# Patient Record
Sex: FEMALE | Race: WHITE | Employment: PART TIME | URBAN - METROPOLITAN AREA
[De-identification: names, ages, dates, MRNs, and addresses within clinical notes are randomized per-mention and may not be internally consistent; named-entity substitution may affect disease eponyms.]

---
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
## 2021-01-04 ENCOUNTER — HEALTH MAINTENANCE LETTER (OUTPATIENT)
Age: 21
End: 2021-01-04

## 2021-04-25 ENCOUNTER — HEALTH MAINTENANCE LETTER (OUTPATIENT)
Age: 21
End: 2021-04-25

## 2021-06-11 ENCOUNTER — RECORDS - HEALTHEAST (OUTPATIENT)
Dept: LAB | Facility: CLINIC | Age: 21
End: 2021-06-11

## 2021-06-11 LAB — TSH SERPL DL<=0.005 MIU/L-ACNC: 1.49 UIU/ML (ref 0.3–5)

## 2021-06-13 LAB — DHEA-S SERPL-MCNC: 238 UG/DL (ref 63–373)

## 2021-06-14 LAB — 25(OH)D3 SERPL-MCNC: 31.3 NG/ML (ref 30–80)

## 2021-10-10 ENCOUNTER — HEALTH MAINTENANCE LETTER (OUTPATIENT)
Age: 21
End: 2021-10-10

## 2022-05-18 NOTE — GROUP NOTE
Psychotherapy Group Note    PATIENT'S NAME: Mirtha Gary  MRN:   [id number]  :   2000  ACCT. NUMBER: [id number]  DATE OF SERVICE: 20  START TIME: 10:00 AM  END TIME: 10:50 AM  FACILITATOR: Lety Cheek [facility]  TOPIC:  EBP Group: DDP Relapse Prevention  Adult Dual Diagnosis Day Treatment  TRACK: 1    NUMBER OF PARTICIPANTS: 4    Telemedicine Visit: The patient's condition can be safely assessed and treated via synchronous audio and visual telemedicine encounter.      Reason for Telemedicine Visit: Services only offered telehealth    Originating Site (Patient Location): Patient's home    Distant Site (Provider Location): Provider Remote Setting    Consent:  The patient/guardian has verbally consented to: the potential risks and benefits of telemedicine (video visit) versus in person care; bill my insurance or make self-payment for services provided; and responsibility for payment of non-covered services.     Mode of Communication:  Video Conference via [employer]    As the provider I attest to compliance with applicable laws and regulations related to telemedicine.      Summary of Group / Topics Discussed:  DDP Relapse Prevention: Observing and Describing Triggers for Substance Use: Patients explored in greater depth factors that contribute to a relapse including: emotions, thoughts, and situations that trigger substance use. Goal of topic is to assist patients in increased recognition of specific emotions, thoughts, and situations they may experience that increases risk. Patients were able to identify and share their specific emotions, thoughts, and situations with the group. Patients also learned  bridge burning  skill to assist in removing means of acting on substance use.    Patient Session Goals / Objectives:    Verbalized understanding of the importance of awareness of triggers for substance use    Identified their own individual triggers    [place] effective coping skills in response to triggers  for substance use including  bridge burning  skill      Patient Participation / Response:  Fully participated with the group by sharing personal reflections / insights and openly received / provided feedback with other participants.    Demonstrated understanding of topics discussed through group discussion and participation, Demonstrated understanding of utilizing relapse prevention skills to manage urges and maintain sobriety, Identified / Expressed personal readiness to utilize relapse prevention skills and Verbalized understanding of how relapse prevention skills can assist in maintaining sobriety    Treatment Plan:  Patient has a current master individualized treatment plan.  See Epic treatment plan for more information.    Lety Cheek, [facility]    Clindamycin Pregnancy And Lactation Text: This medication can be used in pregnancy if certain situations. Clindamycin is also present in breast milk.

## 2022-05-22 ENCOUNTER — HEALTH MAINTENANCE LETTER (OUTPATIENT)
Age: 22
End: 2022-05-22

## 2022-06-06 NOTE — TELEPHONE ENCOUNTER
Shannon Pruitt is requesting a refill on the following medication(s):  Requested Prescriptions     Pending Prescriptions Disp Refills    fluticasone-salmeterol (ADVAIR DISKUS) 250-50 MCG/ACT AEPB diskus inhaler [Pharmacy Med Name: Kevin Sweeney 250-50 DISKUS]       Sig: inhale 1 puff by mouth and INTO THE LUNGS every 12 hours       Last Visit Date (If Applicable):  9/8/1808    Next Visit Date:    8/4/2022 Writer spoke with Mirtha to complete discharge assessments (PHQ-9 and ALETHA-7) over the phone.      Lety Cheek LPC on 4/29/2020 at 11:56 AM

## 2022-06-30 ENCOUNTER — LAB REQUISITION (OUTPATIENT)
Dept: LAB | Facility: CLINIC | Age: 22
End: 2022-06-30

## 2022-06-30 DIAGNOSIS — R53.82 CHRONIC FATIGUE, UNSPECIFIED: ICD-10-CM

## 2022-06-30 PROCEDURE — 84443 ASSAY THYROID STIM HORMONE: CPT | Performed by: NURSE PRACTITIONER

## 2022-06-30 PROCEDURE — 82306 VITAMIN D 25 HYDROXY: CPT | Performed by: NURSE PRACTITIONER

## 2022-07-01 LAB — TSH SERPL DL<=0.005 MIU/L-ACNC: 2.57 UIU/ML (ref 0.3–4.2)

## 2022-07-04 LAB — DEPRECATED CALCIDIOL+CALCIFEROL SERPL-MC: 51 UG/L (ref 20–75)

## 2022-09-18 ENCOUNTER — HEALTH MAINTENANCE LETTER (OUTPATIENT)
Age: 22
End: 2022-09-18

## 2023-06-04 ENCOUNTER — HEALTH MAINTENANCE LETTER (OUTPATIENT)
Age: 23
End: 2023-06-04

## 2023-06-07 ENCOUNTER — LAB REQUISITION (OUTPATIENT)
Dept: LAB | Facility: CLINIC | Age: 23
End: 2023-06-07
Payer: COMMERCIAL

## 2023-06-07 DIAGNOSIS — M13.0 POLYARTHRITIS, UNSPECIFIED: ICD-10-CM

## 2023-06-07 LAB — CRP SERPL-MCNC: 5.98 MG/L

## 2023-06-07 PROCEDURE — 86431 RHEUMATOID FACTOR QUANT: CPT | Mod: ORL | Performed by: NURSE PRACTITIONER

## 2023-06-07 PROCEDURE — 86140 C-REACTIVE PROTEIN: CPT | Mod: ORL | Performed by: NURSE PRACTITIONER

## 2023-06-07 PROCEDURE — 86038 ANTINUCLEAR ANTIBODIES: CPT | Mod: ORL | Performed by: NURSE PRACTITIONER

## 2023-06-08 LAB
ANA SER QL IF: NEGATIVE
RHEUMATOID FACT SER NEPH-ACNC: <7 IU/ML